# Patient Record
Sex: MALE | Race: WHITE
[De-identification: names, ages, dates, MRNs, and addresses within clinical notes are randomized per-mention and may not be internally consistent; named-entity substitution may affect disease eponyms.]

---

## 2021-03-03 ENCOUNTER — HOSPITAL ENCOUNTER (EMERGENCY)
Dept: HOSPITAL 63 - ER | Age: 31
Discharge: HOME | End: 2021-03-03
Payer: COMMERCIAL

## 2021-03-03 VITALS — HEIGHT: 67 IN | BODY MASS INDEX: 24.22 KG/M2 | WEIGHT: 154.32 LBS

## 2021-03-03 VITALS — SYSTOLIC BLOOD PRESSURE: 138 MMHG | DIASTOLIC BLOOD PRESSURE: 77 MMHG
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
## 2021-03-03 DIAGNOSIS — S83.411A: Primary | ICD-10-CM
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
## 2021-03-03 PROCEDURE — 73562 X-RAY EXAM OF KNEE 3: CPT

## 2021-03-03 PROCEDURE — 99283 EMERGENCY DEPT VISIT LOW MDM: CPT

## 2021-03-03 NOTE — PHYS DOC
Past History


Past Medical History:  No Pertinent History


Past Surgical History:  Other


Additional Past Surgical Histo:  ORIF L ankle, ORIF L arm.


Alcohol Use:  Occasionally





General Adult


EDM:


Chief Complaint:  KNEE INJURY





HPI:


HPI:





Patient is a 30-year-old male coming in for right knee pain.  Patient states he 

was jumping off his porch, about 3 feet up from the ground, when he felt a pop. 

Patient states he thinks his lower leg deviated outwards.  Has been using 

crutches and not been able to bear weight secondary to pain.  Has had previous 

injuries to this knee before.  Has had swelling of the joint which restricts 

movement.  States he otherwise has been well.  No other injuries.





Review of Systems:


Review of Systems:


All other systems within normal limits except for as noted in the HPI





Allergies:


Allergies:





Allergies








Coded Allergies Type Severity Reaction Last Updated Verified


 


  No Known Drug Allergies    3/3/21 No











Physical Exam:


PE:


Constitutional: Well developed, well nourished, no acute distress, non-toxic 

appearance. []


HENT: Normocephalic, atraumatic, bilateral external ears normal,  nose normal. 

[]


Eyes: PERRLA, conjunctiva normal, no discharge. [] 


Neck: No rigidity, supple, no stridor. [] 


Cardiovascular: Regular rate and rhythm, brisk cap refill []


Lungs & Thorax: Non labored symmetric respirations, no tachypnea or respiratory 

distress []


Abdomen: Soft, nondistended.


Skin: Warm, dry, no erythema, no rash. [] 


Back: Unremarkable


Extremities: No deformities, range of motion grossly intact, no lower extremity 

edema.  Right knee exam: Large joint effusion with swelling to the medial 

aspect.  Valgus laxity.  No anterior, posterior, varus laxity.  Flexion 

extension somewhat limited by pain and effusion.  [] 


Neurologic: Alert and oriented X 3, no focal deficits noted. []


Psychologic: Affect normal, judgement normal, mood normal. []





Current Patient Data:


Vital Signs:





                                   Vital Signs








  Date Time  Temp Pulse Resp B/P (MAP) Pulse Ox O2 Delivery O2 Flow Rate FiO2


 


3/3/21 07:05 98.5 60 16 138/77 (97) 100   











EKG:


EKG:


[]





Radiology/Procedures:


Radiology/Procedures:





Right knee 3 views.





HISTORY: Fall, pain and swelling





3 views were taken of the right knee. There is not evidence of an acute 

fracture. There is a large joint effusion. MRI of the knee could be of benefit.





IMPRESSION:


1. No acute fracture.


2. Large joint effusion right knee.[]





Heart Score:


Risk Factors:


Risk Factors:  DM, Current or recent (<one month) smoker, HTN, HLP, family 

history of CAD, obesity.


Risk Scores:


Score 0 - 3:  2.5% MACE over next 6 weeks - Discharge Home


Score 4 - 6:  20.3% MACE over next 6 weeks - Admit for Clinical Observation


Score 7 - 10:  72.7% MACE over next 6 weeks - Early Invasive Strategies





Course & Med Decision Making:


Course & Med Decision Making


Pertinent Labs and Imaging studies reviewed. (See chart for details)





[]





Dragon Disclaimer:


Dragon Disclaimer:


This electronic medical record was generated, in whole or in part, using a voice

 recognition dictation system.





Departure


Departure:


Impression:  


   Primary Impression:  


   Sprain of medial collateral ligament of right knee


Disposition:  01 DC HOME SELF CARE/HOMELESS


Condition:  STABLE


Referrals:  


PCP,NO (PCP)


Patient Instructions:  RICE - Routine Care for Injuries





Additional Instructions:  


Follow-up with Dr. Townsend:


Address: [address] [place] [address]


Phone: [phone]











DIANA AGUILAR MD               Mar 3, 2021 08:03

## 2021-03-03 NOTE — RAD
Right knee 3 views.



HISTORY: Fall, pain and swelling



3 views were taken of the right knee. There is not evidence of an acute fracture. There is a large johny
int effusion. MRI of the knee could be of benefit.



IMPRESSION:

1. No acute fracture.

2. Large joint effusion right knee.



Electronically signed by: Jayce Vega MD (3/3/2021 7:48 AM) [id number]